# Patient Record
Sex: FEMALE | Race: WHITE
[De-identification: names, ages, dates, MRNs, and addresses within clinical notes are randomized per-mention and may not be internally consistent; named-entity substitution may affect disease eponyms.]

---

## 2020-12-06 ENCOUNTER — HOSPITAL ENCOUNTER (INPATIENT)
Dept: HOSPITAL 96 - M.ERS | Age: 75
LOS: 4 days | Discharge: HOME HEALTH | DRG: 481 | End: 2020-12-10
Attending: INTERNAL MEDICINE | Admitting: INTERNAL MEDICINE
Payer: COMMERCIAL

## 2020-12-06 VITALS — DIASTOLIC BLOOD PRESSURE: 73 MMHG | SYSTOLIC BLOOD PRESSURE: 159 MMHG

## 2020-12-06 VITALS — BODY MASS INDEX: 32.6 KG/M2 | WEIGHT: 184 LBS | HEIGHT: 63 IN

## 2020-12-06 VITALS — SYSTOLIC BLOOD PRESSURE: 157 MMHG | DIASTOLIC BLOOD PRESSURE: 72 MMHG

## 2020-12-06 DIAGNOSIS — Z20.828: ICD-10-CM

## 2020-12-06 DIAGNOSIS — D68.69: ICD-10-CM

## 2020-12-06 DIAGNOSIS — Z96.641: ICD-10-CM

## 2020-12-06 DIAGNOSIS — D63.8: ICD-10-CM

## 2020-12-06 DIAGNOSIS — E78.5: ICD-10-CM

## 2020-12-06 DIAGNOSIS — D50.0: ICD-10-CM

## 2020-12-06 DIAGNOSIS — I48.91: ICD-10-CM

## 2020-12-06 DIAGNOSIS — Z88.2: ICD-10-CM

## 2020-12-06 DIAGNOSIS — Z85.3: ICD-10-CM

## 2020-12-06 DIAGNOSIS — I10: ICD-10-CM

## 2020-12-06 DIAGNOSIS — Z90.13: ICD-10-CM

## 2020-12-06 DIAGNOSIS — M80.052A: Primary | ICD-10-CM

## 2020-12-06 LAB
ABSOLUTE BASOPHILS: 0 THOU/UL (ref 0–0.2)
ABSOLUTE EOSINOPHILS: 0 THOU/UL (ref 0–0.7)
ABSOLUTE MONOCYTES: 0.5 THOU/UL (ref 0–1.2)
ALBUMIN SERPL-MCNC: 3.2 G/DL (ref 3.4–5)
ALP SERPL-CCNC: 77 U/L (ref 46–116)
ALT SERPL-CCNC: 53 U/L (ref 30–65)
ANION GAP SERPL CALC-SCNC: 5 MMOL/L (ref 7–16)
APTT BLD: 24.7 SECONDS (ref 25–31.3)
AST SERPL-CCNC: 45 U/L (ref 15–37)
BASOPHILS NFR BLD AUTO: 0.4 %
BILIRUB SERPL-MCNC: 0.4 MG/DL
BILIRUB UR-MCNC: NEGATIVE MG/DL
BUN SERPL-MCNC: 13 MG/DL (ref 7–18)
CALCIUM SERPL-MCNC: 7.8 MG/DL (ref 8.5–10.1)
CHLORIDE SERPL-SCNC: 104 MMOL/L (ref 98–107)
CO2 SERPL-SCNC: 30 MMOL/L (ref 21–32)
COLOR UR: YELLOW
CREAT SERPL-MCNC: 0.8 MG/DL (ref 0.6–1.3)
EOSINOPHIL NFR BLD: 0.6 %
GLUCOSE SERPL-MCNC: 119 MG/DL (ref 70–99)
GRANULOCYTES NFR BLD MANUAL: 59.6 %
HCT VFR BLD CALC: 33.7 % (ref 37–47)
HGB BLD-MCNC: 11.1 GM/DL (ref 12–15)
INR PPP: 1
KETONES UR STRIP-MCNC: NEGATIVE MG/DL
LYMPHOCYTES # BLD: 2 THOU/UL (ref 0.8–5.3)
LYMPHOCYTES NFR BLD AUTO: 32 %
MCH RBC QN AUTO: 30.9 PG (ref 26–34)
MCHC RBC AUTO-ENTMCNC: 32.8 G/DL (ref 28–37)
MCV RBC: 94 FL (ref 80–100)
MONOCYTES NFR BLD: 7.4 %
MPV: 7.4 FL. (ref 7.2–11.1)
NEUTROPHILS # BLD: 3.7 THOU/UL (ref 1.6–8.1)
NITRITE UR QL STRIP: NEGATIVE
NUCLEATED RBCS: 0 /100WBC
PLATELET COUNT*: 262 THOU/UL (ref 150–400)
POTASSIUM SERPL-SCNC: 3.9 MMOL/L (ref 3.5–5.1)
PROT SERPL-MCNC: 6.7 G/DL (ref 6.4–8.2)
PROT UR QL STRIP: NEGATIVE
PROTHROMBIN TIME: 10.6 SECONDS (ref 9.2–11.5)
RBC # BLD AUTO: 3.59 MIL/UL (ref 4.2–5)
RBC # UR STRIP: NEGATIVE /UL
RDW-CV: 18.3 % (ref 10.5–14.5)
SODIUM SERPL-SCNC: 139 MMOL/L (ref 136–145)
SP GR UR STRIP: 1.02 (ref 1–1.03)
URINE CLARITY: CLEAR
URINE GLUCOSE-RANDOM: NEGATIVE
URINE LEUKOCYTES: NEGATIVE
UROBILINOGEN UR STRIP-ACNC: 0.2 E.U./DL (ref 0.2–1)
WBC # BLD AUTO: 6.3 THOU/UL (ref 4–11)

## 2020-12-06 NOTE — OP
[address]  [address]                    OPERATIVE REPORT              
_______________________________________________________________________________
 
Name:       WHITNEY SEXTON            Room:           [address]#:  [id number]      Account #:      [id number]  
Admission:  12/06/20     Attend Phys:    Noel Conner MD 
Discharge:               Date of Birth:  [birth year]  
         Report #: [id number]
                                                                     [id number]  
_______________________________________________________________________________
THIS REPORT FOR:  //name//                      
 
cc:  Juan Bear David DO                                                     ~
CC: Juan Conner
 
DICTATED BY: Nicanor Iniguez DO
 
DATE OF SERVICE:  12/07/2020
 
 
Dr. Iniguez dictating operative report for Dr. Tamir Horan.
 
PREOPERATIVE DIAGNOSIS:  Left intertrochanteric femur fracture.
 
POSTOPERATIVE DIAGNOSIS:  Left intertrochanteric femur fracture.
 
PROCEDURE:  Open reduction and internal fixation of left hip with a
cephalomedullary nail.
 
IMPLANTS USED:  Emilee gamma nail 11 x 180 with a 110 mm lag screw and 40 mm
distal interlocking screw.
 
ATTENDING:  Tamir Horan DO
 
ASSISTANTS:  Nicanor Iniguez DO
 
ANESTHESIA:  General.
 
FLUIDS:  Crystalloid per Anesthesia.
 
ESTIMATED BLOOD LOSS:  100 mL.
 
COMPLICATIONS:  None.
 
SPECIMENS:  None.
 
COMPLICATIONS:  None.
 
CONDITION:  Stable to PACU.
 
DISPOSITION:  Recovery in PACU and transferred back to the floor.
 
ANTIBIOTICS:  2 grams Ancef IV preop.
 
 
 
 
[address]  [address]                    OPERATIVE REPORT              
_______________________________________________________________________________
 
Name:       WHITNEY SEXTON            Room:           [address]#:  [id number]      Account #:      [id number]  
Admission:  12/06/20     Attend Phys:    Noel Conner MD 
Discharge:               Date of Birth:  [birth year]  
         Report #: [id number]
                                                                     [id number]  
_______________________________________________________________________________
INDICATIONS:  The patient is a very pleasant 75-year-old female with past
medical history of breast cancer.  She sustained a ground level fall outside at
home while setting up [employer].  She fell onto her left hip.  She
was brought to [facility] where x-rays identified a displaced
intertrochanteric femur fracture.  We recommended surgical fixation.  The risks,
benefits, alternatives and possible complications were discussed at length and
she is agreeable to proceed.
 
DESCRIPTION OF PROCEDURE:  The patient was met in the preoperative area.  The
correct site was marked and consent was obtained both verbally and written.  She
was transferred to the operative suite and given the benefit of general
anesthesia.  She was then transferred supine position to the Dolores table.  The
left foot was placed in the boot and attached to the spar.  The right leg was
placed in a leg charlton.  She was secured to the table with a belt.  We performed
a reduction with traction and internal rotation.  We took fluoroscopic images to
confirm adequate reduction.  The left hip was then prepped and draped in a
normal sterile fashion.  A timeout was performed to identify the correct
patient, procedure and operative site.  All in the room were in agreement.  We
started with an incision just proximal to the greater trochanter on the lateral
side of the hip.  A guidewire was used to identify our starting point on AP and
lateral fluoroscopic images.  We then used the opening reamer to gain access to
the intramedullary canal.  We then placed the 11 x 180 nail into the
intramedullary canal to a depth appropriate for our lag screw.  We then used the
double sleeve and inserted this through the guide.  We made an incision
laterally for the lag screw and carried this down to the tensor fascia.  We used
a guidewire into the femoral head and neck, identifying appropriate position of
the guidewire on AP and lateral images.  We then reamed to 105 mm for a size 110
mm lag screw.  This was placed by hand followed by the set screw.  The set screw
was found to be within this loops and confirmed by manual rotation.  We then
made an incision for the distal interlocking screw.  This dissection was carried
down to the level of the tensor fascia.  We then drilled, measured and placed a
distal interlocking screw.  The guide was then removed.  Final fluoroscopic
images were taken and saved.  Fascia was closed with 0 Vicryl suture in
figure-of-eight fashion.  Dermal tissue was reapproximated with 2-0 Monocryl
suture followed by staples on the skin and a sterile Mepilex dressing.  She was
extubated by the Anesthesia team and transferred to the PACU in stable
condition.  All needle and sponge counts were correct x 2 at the end of the
case.  She tolerated the procedure well without any complications.
 
Dr. Tamir Horan was present and scrubbed throughout all critical aspects of the
case.
 
 
 
                       
                                        By:                                
                 
D: 12/07/20 1820_______________________________________
T: 12/07/20 1841Roberjennifer Horan DO               /nt

## 2020-12-07 VITALS — SYSTOLIC BLOOD PRESSURE: 148 MMHG | DIASTOLIC BLOOD PRESSURE: 56 MMHG

## 2020-12-07 VITALS — DIASTOLIC BLOOD PRESSURE: 58 MMHG | SYSTOLIC BLOOD PRESSURE: 104 MMHG

## 2020-12-07 VITALS — DIASTOLIC BLOOD PRESSURE: 68 MMHG | SYSTOLIC BLOOD PRESSURE: 139 MMHG

## 2020-12-07 LAB
HCT VFR BLD CALC: 29 % (ref 37–47)
HGB BLD-MCNC: 9.5 GM/DL (ref 12–15)
MCH RBC QN AUTO: 30.3 PG (ref 26–34)
MCHC RBC AUTO-ENTMCNC: 32.7 G/DL (ref 28–37)
MCV RBC: 92.8 FL (ref 80–100)
MPV: 7.4 FL. (ref 7.2–11.1)
PLATELET COUNT*: 217 THOU/UL (ref 150–400)
RBC # BLD AUTO: 3.13 MIL/UL (ref 4.2–5)
RDW-CV: 17.8 % (ref 10.5–14.5)
WBC # BLD AUTO: 5.4 THOU/UL (ref 4–11)

## 2020-12-07 PROCEDURE — 0QS704Z REPOSITION LEFT UPPER FEMUR WITH INTERNAL FIXATION DEVICE, OPEN APPROACH: ICD-10-PCS | Performed by: ORTHOPAEDIC SURGERY

## 2020-12-07 NOTE — NUR
PT RETURNED TO ROOM FROM SURGERY. AWAKE AND ALERT. A & OX X 4. PWD. MEPILEX
DRESSING LEFT HIP, C, D & I. FEET PUMPS ON. PT ON CAPNO. 34, O2 SAT 98% 3L NC.
INDWELLING CA CATH INTACT AND PATENT YELLOW URINE. IV LR AT 80MLS/HR. PAIN
IS CONTROLED AT 4/10.  AT BEDSIDE. CALL LIGHT WITHIN REACH. WILL
CONTINUE TO MONITOR.

## 2020-12-07 NOTE — NUR
SPOKE WITH PT.AND  THIS AM ABOUT 10:00. SHE IS AWARE SHE WILL GO TO
SURGERY THIS AFTERNOON.    SHE SAID SHE WILL BE GLAD TO GET IT OVER WITH ,SO
SHE CAN GET ON TO RECOVERING. SHE HAD A HIP REPLACEMENT ABOUT 6 YEARS AGO. HAS
A FWW FROM THEN BUT DOESN'T USE. SHE SAID SHE WENT HOME FROM THE HOSPITAL
AFTER SEV. DAYS AFTER SURGERY, BEFOR. DISCUSSED REHAB/SNF OR HOME WITH HH
DEPENDENING ON HOW SHE DOES WIHT THERAPIES. SHE IS INDEPENDENT AT HOME. DOES
HOUSEHOLD CHORES, COOKS, DRIVES. HAS ABOUT 4 STAIRS TO GET INSIDE THE HOUSE.
 IS SUPPORTIVE. THEY ALSO HAVE A SON AND DAUGHTER.

## 2020-12-07 NOTE — NUR
Admission at 2015. She has a L femur fracture. L leg is very painful and her
leg is externally rotated. She had breast cancer and is currently recieving
chemotherapy which she is supposed to get tommorrow. Dr Rodriguez (ortho) was
notified of the consult in the ED. She is recieving fentanyl 50 mcg for pain
and had meds x 3. Denney cath was placed because she is unable to to move
without being in extreme pain. She was outside hanging justin decorations
and fell off of something. At this time she is comfortable.

## 2020-12-08 VITALS — DIASTOLIC BLOOD PRESSURE: 54 MMHG | SYSTOLIC BLOOD PRESSURE: 120 MMHG

## 2020-12-08 VITALS — DIASTOLIC BLOOD PRESSURE: 54 MMHG | SYSTOLIC BLOOD PRESSURE: 123 MMHG

## 2020-12-08 VITALS — DIASTOLIC BLOOD PRESSURE: 52 MMHG | SYSTOLIC BLOOD PRESSURE: 113 MMHG

## 2020-12-08 VITALS — DIASTOLIC BLOOD PRESSURE: 59 MMHG | SYSTOLIC BLOOD PRESSURE: 127 MMHG

## 2020-12-08 VITALS — DIASTOLIC BLOOD PRESSURE: 54 MMHG | SYSTOLIC BLOOD PRESSURE: 110 MMHG

## 2020-12-08 VITALS — DIASTOLIC BLOOD PRESSURE: 54 MMHG | SYSTOLIC BLOOD PRESSURE: 127 MMHG

## 2020-12-08 LAB
HCT VFR BLD CALC: 27.3 % (ref 37–47)
HGB BLD-MCNC: 8.9 GM/DL (ref 12–15)

## 2020-12-08 NOTE — NUR
A&OX 4, PWD. ROBY DC'D THIS AM AND PT ABLE TO URINATE WITHOUT DIFFICULTY. PT
ABLE TO HAVE BM TODAY. LUNGS CLEAR, HEART TONES REGULAR, +BS X 4 QUADS. PEDAL
PULSES PRESENT NO EDEMA NOTED. LEFT HIP INCISION COVED WITH MEPILEX AND
DRESSING IS CLEAN, DRY AND INTACT.  PT WITH PORT A-CATH IN RIGHT CHEST SALINE
LOCKED. PT TOLERATED WORKING WITH PT/OT TODAY. TRYING TO GET PAIN MORE UNDER
CONTROL. WHEN I ASK PATIENT IF SHE IS IN PAIN SHE HAS SAID SEVERAL TIMES SHE
DID NOT WANT PAIN MEDICATION OR SHE WANTS ME TO COME BACK LATER.  WAS
UP VISITING TODAY. PT UP SITTING IN CHAIR ALL AFTERNOON AND TOLERATING WELL.
CALL LIGHT WITHIN REACH. WILL CONTINUE TO MONITOR. POSSIBLE HOME TOMORROW.

## 2020-12-09 VITALS — SYSTOLIC BLOOD PRESSURE: 119 MMHG | DIASTOLIC BLOOD PRESSURE: 59 MMHG

## 2020-12-09 VITALS — DIASTOLIC BLOOD PRESSURE: 60 MMHG | SYSTOLIC BLOOD PRESSURE: 112 MMHG

## 2020-12-09 VITALS — DIASTOLIC BLOOD PRESSURE: 46 MMHG | SYSTOLIC BLOOD PRESSURE: 114 MMHG

## 2020-12-09 VITALS — SYSTOLIC BLOOD PRESSURE: 138 MMHG | DIASTOLIC BLOOD PRESSURE: 68 MMHG

## 2020-12-09 LAB
HCT VFR BLD CALC: 24.6 % (ref 37–47)
HGB BLD-MCNC: 7.9 GM/DL (ref 12–15)

## 2020-12-09 NOTE — NUR
PT REMAINS ALERT AND ORIENTED X 4. PT WORKED WITH PHYSICAL THERAPY X2 THIS
SHIFT. PAIN CONTROLLED WITH PRN MEDICATIOS. BENJI JON APPOROVED 2 VISITORS TO
SEE PATIENT TOMORROW TO WORK WITH PHYSICAL THERAPY FOR DISCHARGE. HOURLY
ROUNDS COMPLETED. FALL RISK PRECAUTIONS IN PLACE. WILL CONTINUE TO MONITOR.

## 2020-12-09 NOTE — NUR
PT IN TO WORK WITH PT AT THIS TIME. PAIN MEDICATION GIVEN BEFORE TREATMENT. UP
IN THE CHAIR AT THIS TIME.

## 2020-12-09 NOTE — NUR
HOUSE SUPERVISOR BENJI APPROVED 2 VISITORS TO TRAIN WITH PATIENT AND PHYSICAL
THERAPY. BOTH FAMILY MEMBERS WILL BE TAKING CARE OF THE PATIENT AND WOULD LIKE
TO TRAIN WITH PHYSICAL THERAPY.

## 2020-12-09 NOTE — NUR
PT INFORMED OF PLAN TO D/C TODAY IF SHE IS COMFORTABLE. INFORMED TO LET
NURSING KNOW WHEN SON ARRIVES TO COORDINATE WITH SECOND PT SESSION THIS
AFTERNOON. STATES UNDERSTANDING.

## 2020-12-09 NOTE — NUR
SPOKE WITH PT. SHE WAS UP IN CHAIR FOR LUNCH. SHE SAID SHE FELT LIKE SHE
NEEDED ANOTHER DAY IN HOSPITAL.  SAID THIS WAS OK.  WILL STAY ONE
MORE DAY AND HAVE 1-2 MORE THERAPY SESSIONS. SHE WOULD LIKE TO HAVE HOME
HEALTH.HER. CM WILL FIND AGENCY IN HER AREA, THAT TAKE HER INS.
 WILL BE THERE WITH HER AT HOME. PT.TAKES ELIQUIS AT HOME ALREADY FOR
A.FIB. SHE HAS A WALKER AT HOME FOR USE.

## 2020-12-09 NOTE — NUR
PT A&O, VSS ON ROOM AIR, PT UP WITIH ASSIST, NO C/O PAIN SO FAR THIS SHIFT, PT
SLEEPING WELL, WILL CONTINUE TO MONITOR.

## 2020-12-10 VITALS — SYSTOLIC BLOOD PRESSURE: 137 MMHG | DIASTOLIC BLOOD PRESSURE: 53 MMHG

## 2020-12-10 VITALS — DIASTOLIC BLOOD PRESSURE: 53 MMHG | SYSTOLIC BLOOD PRESSURE: 137 MMHG

## 2020-12-10 NOTE — NUR
CM SENT REFERRAL TO [facility] [phone].
 
[facility] CANNOT ACCEPT
[facility] CANNOT ACCEPT TO CAPABILITY OR COVERAGE AREA
[facility] DOES NOT COVER THE AREA

## 2020-12-10 NOTE — NUR
PATIENT DISCHARGED TO HOME WITH HOME HEALTH.  DISCHARGE PAPERS REVIEWED AND
SIGNED. PRESCRIPTIONS AND INFORMATION SHEETS GIVEN. PORT-A-CATH PACKED WITH
HEPARIN AND DEACCESSED. CASE MANAGEMENT HAD WALKER PROVIDED. PATIENT DENIES
ANY FURTHER NEEDS. PATIENT TAKEN BY WHEELCHAIR TO EXIT. LEFT WITH SON AND
.

## 2020-12-10 NOTE — NUR
PT A&OX4, VSS ON ROOM AIR, PT UP W/ASSIST TO BSC, NO C/O PAIN THIS SHIFT, PT
SLEEPING WELL, WILL CONTINUE TO MONITOR.

## 2020-12-14 NOTE — PATH
[address]  [address]                    PATHOLOGY RPT PROCEDURE       
_______________________________________________________________________________
 
Name:       MARVA SEXTON            Room:           [address] [place]  
[facility]#:  [id number]      Account #:      [id number]  
Admission:  12/06/20     Date of Birth:  [birth year]  
Discharge:  12/10/20                   Report #:    [id number]
                                                         Path Case #: [id number]
_______________________________________________________________________________
 
[facility] Accession Number: [id number]
.                                                                01
Material submitted:                                        .
hip - REAMINGS LEFT HIP. Modifiers: left
.                                                                01
Clinician provided ICD-10:
M80.052A
D68.69
.                                                                01
Clinical history:                                          .
LEFT PROXIMAL INTERTROCHANTER FEMUR FRACTURE
.                                                                02
**********************************************************************
Diagnosis:
Bone and soft tissue "reamings from left hip fracture", excision:
  - Fragments of trabecular bone, skeletal muscle, fibrous tissue, blood,
     fibrin, and acute and chronic inflammation, consistent with fracture
     site.
  - Trilineage hematopoiesis.
  - Negative for malignancy.
(MLK:jewell; 12/11/2020)
QMS  12/14/2020  1158 Local
**********************************************************************
.                                                                02
Electronically signed:                                     .
Daniele Andino MD, Pathologist
NPI- [id number]
.                                                                01
Gross description:                                         .
The specimen is received in formalin, labeled "Marva Sexton, reamings
left hip".  Received are reamings of red-brown tissue admixed with bone
measuring 3.0 x 2.5 x 0.5 cm in aggregate dimensions.  The specimen is
filtered and entirely spitted in cassette A1, following light
decalcification.
(CAA; 12/10/2020)
QAC/QAC  12/10/2020  1221 Local
.                                                                02
Microscopic:                                                    .
Immunohistochemical stain results (properly controlled) - A1:
.
  AE1/AE3 - Negative for epithelial cells.
.
(MLK:jewell; 12/11/2020)
.                                                                02
Pathologist provided ICD-10:
M80.052A
.                                                                02
CPT                                                        .
 
[place] [address]                    PATHOLOGY RPT PROCEDURE       
_______________________________________________________________________________
 
Name:       MARVA SEXTON            Room:           [address]#:  [id number]      Account #:      [id number]  
Admission:  12/06/20     Date of Birth:  [birth year]  
Discharge:  12/10/20                   Report #:    [id number]
                                                         Path Case #: [id number]
_______________________________________________________________________________
[id number], [id number], [id number]
Specimen Comment: A courtesy copy of this report has been sent to [phone]
[phone]
Specimen Comment: [phone], [phone]
Specimen Comment: Report sent to ,DR DE JESUS / DR GUTHRIE
***Performed at:  01
   [address], [place], [place]  [id number]
   MD Neal Lloyd MD Phone:  [phone]
***Performed at:  02
   [facility]
   [address], [place], [place]  [id number]
   MD Kenn Marks MD Phone:  [phone]

## 2023-07-04 NOTE — NUR
ASSUMED PT'S CARE AT ABOUT 1930. ALERT AND ORIENTED. VSS  ON 3L NC. CAPNO
IN PLACE. MEDS GIVEN PER EMAR. PRN PAIN MED GIVEN X1 THIS SHIFT. PT UP X1
ASSSIT TO BSC. FALL PRECAUTION IN PLACE. BILATERAL SAMAN HOSE. SCDs IN PLACE. PT
ATTEMPTED GETTING UP X1 WITHOUT CALLING, OTHER HAVE BEEN COMPLIANT WITH CALL
LIGHT. CALL LIGHT WITHN REACH. BM NOTED THIS SHIFT. WILL CONTINUE TO MONITOR. moderate assist (50% patients effort)

## 2023-09-27 NOTE — EKG
[place] [address]
Phone:  [phone]                     ELECTROCARDIOGRAM REPORT      
_______________________________________________________________________________
 
Name:         GUICHOWHITNEY CANTOR           Room:          [address]    ADM [place] 
[facility].#:    [id number]     Account #:     [id number]  
Admission:    20    Attend Phys:   Noel Conner, 
Discharge:                Date of Birth: [birth year]  
Date of Service: 20 1541  Report #:      [id number]
        [id number]
_______________________________________________________________________________
THIS REPORT FOR:  //name//                      
 
                         [facility] ED
                                       
Test Date:    2020               Test Time:    15:41:25
Pat Name:     WHITNEY SEXTON        Department:   
Patient ID:   [id number]            Room:         [address]
Gender:       F                        Technician:   CCD
:          1945               Requested By: Diallo Watson
Order Number: [id number]    Zan MD:   Juan Nuñez
                                 Measurements
Intervals                              Axis          
Rate:         80                       P:            15
NY:           143                      QRS:          10
QRSD:         93                       T:            41
QT:           426                                    
QTc:          492                                    
                           Interpretive Statements
Sinus rhythm
septal q waves
Low voltage, precordial leads
Borderline prolonged QT interval
No previous ECG available for comparison
Electronically Signed On 2020 14:57:18 CST by Juan Nuñez
[email or web address]
 
 
 
 
 
 
 
 
 
 
 
 
 
 
 
 
 
 
 
  <ELECTRONICALLY SIGNED>
                                           By: Juan Nuñez MD, FACC      
  20     1457
D: 20 1541   _____________________________________
T: 20 1541   Juan Nuñez MD, [facility]        /EPI
normal...